# Patient Record
Sex: MALE | Race: BLACK OR AFRICAN AMERICAN | Employment: FULL TIME | URBAN - METROPOLITAN AREA
[De-identification: names, ages, dates, MRNs, and addresses within clinical notes are randomized per-mention and may not be internally consistent; named-entity substitution may affect disease eponyms.]

---

## 2024-06-17 ENCOUNTER — HOSPITAL ENCOUNTER (EMERGENCY)
Age: 39
Discharge: HOME | End: 2024-06-17
Attending: EMERGENCY MEDICINE
Payer: COMMERCIAL

## 2024-06-17 VITALS
TEMPERATURE: 98.8 F | OXYGEN SATURATION: 97 % | BODY MASS INDEX: 26.86 KG/M2 | SYSTOLIC BLOOD PRESSURE: 135 MMHG | WEIGHT: 177.25 LBS | RESPIRATION RATE: 16 BRPM | DIASTOLIC BLOOD PRESSURE: 83 MMHG | HEIGHT: 68 IN | HEART RATE: 72 BPM

## 2024-06-17 DIAGNOSIS — S39.012A STRAIN OF LUMBAR REGION, INITIAL ENCOUNTER: Primary | ICD-10-CM

## 2024-06-17 DIAGNOSIS — S76.312A HAMSTRING STRAIN, LEFT, INITIAL ENCOUNTER: ICD-10-CM

## 2024-06-17 PROCEDURE — 6370000000 HC RX 637 (ALT 250 FOR IP): Performed by: EMERGENCY MEDICINE

## 2024-06-17 PROCEDURE — 99283 EMERGENCY DEPT VISIT LOW MDM: CPT

## 2024-06-17 RX ORDER — NAPROXEN 250 MG/1
500 TABLET ORAL ONCE
Status: COMPLETED | OUTPATIENT
Start: 2024-06-17 | End: 2024-06-17

## 2024-06-17 RX ORDER — NAPROXEN 375 MG/1
375 TABLET ORAL EVERY 12 HOURS PRN
Qty: 60 TABLET | Refills: 0 | Status: SHIPPED | OUTPATIENT
Start: 2024-06-17

## 2024-06-17 RX ORDER — CYCLOBENZAPRINE HCL 10 MG
10 TABLET ORAL 3 TIMES DAILY PRN
Qty: 15 TABLET | Refills: 0 | Status: SHIPPED | OUTPATIENT
Start: 2024-06-17 | End: 2024-06-22

## 2024-06-17 RX ORDER — LIDOCAINE 50 MG/G
1 PATCH TOPICAL DAILY
Qty: 10 PATCH | Refills: 0 | Status: SHIPPED | OUTPATIENT
Start: 2024-06-17 | End: 2024-06-27

## 2024-06-17 RX ADMIN — NAPROXEN SODIUM 500 MG: 250 TABLET ORAL at 20:23

## 2024-06-17 ASSESSMENT — LIFESTYLE VARIABLES
HOW OFTEN DO YOU HAVE A DRINK CONTAINING ALCOHOL: NEVER
HOW MANY STANDARD DRINKS CONTAINING ALCOHOL DO YOU HAVE ON A TYPICAL DAY: PATIENT DOES NOT DRINK

## 2024-06-17 ASSESSMENT — PAIN DESCRIPTION - LOCATION
LOCATION: LEG
LOCATION: LEG

## 2024-06-17 ASSESSMENT — PAIN DESCRIPTION - ORIENTATION: ORIENTATION: LEFT

## 2024-06-17 ASSESSMENT — PAIN SCALES - GENERAL
PAINLEVEL_OUTOF10: 8
PAINLEVEL_OUTOF10: 8

## 2024-06-18 NOTE — ED PROVIDER NOTES
135/83   Pulse: 72   Resp: 16   Temp: 98.8 °F (37.1 °C)   TempSrc: Oral   SpO2: 97%   Weight: 80.4 kg (177 lb 4 oz)   Height: 1.727 m (5' 8\")        Patient was given the following medications:   Medications   naproxen (NAPROSYN) tablet 500 mg (500 mg Oral Given 6/17/24 2023)          CC/HPI Summary, DDx, ED Course, and Reassessment:   Muscle strain, muscle spasm, hamstring strain, sciatica, degenerative disc disease, lumbar strain, other    Patient as above.  Seen and evaluated.  Nontoxic and afebrile.  Presents complaining of left hamstring pain and left lower back pain after he stepped wrong while playing kickball yesterday.  Patient has normal distal perfusion bilateral lower extremities.  He has no midline tenderness of the cervical, thoracic or lumbar spine.  No step-offs or deformities.  He had no actual fall or trauma but more strained his left leg and left low back when stepping wrong.  He has no acute findings on high-sensitivity neuroexam to suggest cauda equina, epidural abscess, epidural hematoma or cord compression.  Signs and symptoms suggestive of hamstring strain and lumbar strain.  He has no palpable defect of the left hamstring to suggest muscle tear.  No bruising on the left posterior thigh.  Recommended treating with naproxen, Flexeril and Lidoderm.  Patient agreeable.  A dose of oral naproxen given here in the ED.  Patient provided PCP referral and prescriptions for Lidoderm patches, Flexeril and naproxen.  Stressed importance of gentle stretching and no heavy lifting.  Patient agreeable.  Return instruction provided.  All questions answered by discharge.  Patient provided work note at discharge    I estimate there is LOW risk for ABDOMINAL AORTIC ANEURYSM, CAUDA EQUINA SYNDROME, EPIDURAL MASS LESION, SPINAL STENOSIS, OR HERNIATED DISK CAUSING SEVERE STENOSIS, thus I consider the discharge disposition reasonable. Zaki Nunes and I have discussed the diagnosis and risks, and we agree with

## 2024-06-18 NOTE — DISCHARGE INSTRUCTIONS
Thank you for visiting [facility] Emergency Department.    You need to call in morning to make appointment as directed with appropriate doctor.    Should you have any questions regarding your care or further treatment, please call [facility] Emergency Department at [phone].    Take any medications as prescribed.  Flexeril (carbamazepine) is a muscle relaxer and can make you tired.  Do not drink alcohol or drive a vehicle while taking this medication.    Return to ED if symptoms worsen, do not improve, fever > 101.5, excessive nausea or vomiting, and unable to follow up with your physician, or any other care or concern.

## 2024-06-18 NOTE — ED TRIAGE NOTES
Pt states he was playing kickball yesterday and felt a pop in the back of his left thigh area and is now having severe pain there. Pt also states lower back pain that started today. Pt is able to ambulate